# Patient Record
Sex: FEMALE | Race: WHITE | URBAN - METROPOLITAN AREA
[De-identification: names, ages, dates, MRNs, and addresses within clinical notes are randomized per-mention and may not be internally consistent; named-entity substitution may affect disease eponyms.]

---

## 2024-05-20 ENCOUNTER — OFFICE VISIT (OUTPATIENT)
Age: 69
End: 2024-05-20
Payer: COMMERCIAL

## 2024-05-20 VITALS
DIASTOLIC BLOOD PRESSURE: 61 MMHG | WEIGHT: 164 LBS | HEART RATE: 72 BPM | RESPIRATION RATE: 18 BRPM | TEMPERATURE: 96.9 F | OXYGEN SATURATION: 98 % | SYSTOLIC BLOOD PRESSURE: 136 MMHG

## 2024-05-20 DIAGNOSIS — J02.9 ACUTE PHARYNGITIS, UNSPECIFIED ETIOLOGY: Primary | ICD-10-CM

## 2024-05-20 LAB — S PYO AG THROAT QL: NEGATIVE

## 2024-05-20 PROCEDURE — 87070 CULTURE OTHR SPECIMN AEROBIC: CPT | Performed by: PHYSICIAN ASSISTANT

## 2024-05-20 PROCEDURE — G0382 LEV 3 HOSP TYPE B ED VISIT: HCPCS | Performed by: PHYSICIAN ASSISTANT

## 2024-05-20 PROCEDURE — 87880 STREP A ASSAY W/OPTIC: CPT | Performed by: PHYSICIAN ASSISTANT

## 2024-05-20 RX ORDER — ATORVASTATIN CALCIUM 20 MG/1
TABLET, FILM COATED ORAL
COMMUNITY
Start: 2024-05-15

## 2024-05-20 RX ORDER — IBUPROFEN 200 MG
200 TABLET ORAL EVERY 6 HOURS PRN
Start: 2024-05-20

## 2024-05-20 RX ORDER — PROPRANOLOL HYDROCHLORIDE 20 MG/1
TABLET ORAL
COMMUNITY
Start: 2024-05-20

## 2024-05-20 RX ORDER — LIDOCAINE HYDROCHLORIDE 20 MG/ML
15 SOLUTION OROPHARYNGEAL 4 TIMES DAILY PRN
Qty: 100 ML | Refills: 0 | Status: SHIPPED | OUTPATIENT
Start: 2024-05-20

## 2024-05-20 NOTE — PATIENT INSTRUCTIONS
Pharyngitis     Your rapid strep was negative however we will send the throat culture to confirm and if this is positive we will contact you to discuss a new treatment plan.  This is viral pharyngitis and so an antibiotic is not required.   Warm water gargles with salt 3 times daily.  E-Z throat lozenges.  Tylenol or Advil for pain and discomfort  Stay well-hydrated.   Follow up with PCP in 3-5 days.  Proceed to  ER if symptoms worsen.  If tests are performed, our office will contact you with results only if changes need to made to the care plan discussed with you at the visit. You can review your full results on [facility]'s Mychart.        WHAT YOU NEED TO KNOW:   Pharyngitis, or sore throat, is inflammation of the tissues and structures in your pharynx (throat). Pharyngitis is most often caused by bacteria or a virus. Other causes include smoking, allergies, or acid reflux.  DISCHARGE INSTRUCTIONS:   Call your local emergency number (911 in the ) if:   You have trouble breathing or swallowing because your throat is swollen.      Return to the emergency department if:   You are drooling because it hurts too much to swallow.    Your fever is higher than 102?F (39?C) or lasts longer than 3 days.    You are confused.    You taste blood.    Call your doctor if:   Your throat pain gets worse.    You have a painful lump in your throat that does not go away after 5 days.    Your symptoms do not improve after 5 days.    You have questions or concerns about your condition or care.    Medicines:  Viral pharyngitis will go away on its own without treatment. Your sore throat should start to feel better in 3 to 5 days. You may need any of the following:  Antibiotics  treat a bacterial infection.    NSAIDs  help decrease swelling and pain or fever. This medicine is available with or without a doctor's order. NSAIDs can cause stomach bleeding or kidney problems in certain people. If you take blood thinner medicine, always ask  your healthcare provider if NSAIDs are safe for you. Always read the medicine label and follow directions.    Acetaminophen  decreases pain and fever. It is available without a doctor's order. Ask how much to take and how often to take it. Follow directions. Read the labels of all other medicines you are using to see if they also contain acetaminophen, or ask your doctor or pharmacist. Acetaminophen can cause liver damage if not taken correctly.    Take your medicine as directed.  Contact your healthcare provider if you think your medicine is not helping or if you have side effects. Tell your provider if you are allergic to any medicine. Keep a list of the medicines, vitamins, and herbs you take. Include the amounts, and when and why you take them. Bring the list or the pill bottles to follow-up visits. Carry your medicine list with you in case of an emergency.    Manage your symptoms:   Gargle salt water.  Mix ¼ teaspoon salt in an 8 ounce glass of warm water and gargle. Do not swallow. Salt water may help decrease swelling in your throat.    Drink liquids as directed.  You may need to drink more liquids than usual. Liquids may help soothe your throat and prevent dehydration. Ask how much liquid to drink each day and which liquids are best for you.    Use a cool mist humidifier.  This will add moisture to the air and help decrease your cough.    Soothe your throat.  Cough drops, ice, soft foods, or popsicles may help decrease throat pain.    Prevent the spread of pharyngitis:  Cover your mouth and nose when you cough or sneeze. Do not share food or drinks. Wash your hands often. Use soap and water. If soap and water are unavailable, use an alcohol-based hand .       Follow up with your doctor as directed:  Write down your questions so you remember to ask them during your visits.  © Copyright [employer] 2023 Information is for End User's use only and may not be sold, redistributed or otherwise used for  commercial purposes.  The above information is an  only. It is not intended as medical advice for individual conditions or treatments. Talk to your doctor, nurse or pharmacist before following any medical regimen to see if it is safe and effective for you.

## 2024-05-20 NOTE — PROGRESS NOTES
[facility]        NAME: Camrita Wang is a 69 y.o. female  : 1955    MRN: [id number]  DATE: May 20, 2024  TIME: 1:45 PM    Assessment and Plan   Acute pharyngitis, unspecified etiology [J02.9]  1. Acute pharyngitis, unspecified etiology  Lidocaine Viscous HCl (XYLOCAINE) 2 % mucosal solution    ibuprofen (MOTRIN) 200 mg tablet    POCT rapid ANTIGEN strepA    Throat culture            Patient Instructions     Your rapid strep was negative however we will send the throat culture to confirm and if this is positive we will contact you to discuss a new treatment plan.  This is viral pharyngitis and so an antibiotic is not required.   Warm water gargles with salt 3 times daily.  E-Z throat lozenges.  Tylenol or Advil for pain and discomfort  Stay well-hydrated.       Follow up with PCP in 3-5 days.  Proceed to  ER if symptoms worsen.    If tests are performed, our office will contact you with results only if changes need to made to the care plan discussed with you at the visit. [facility]    Chief Complaint     Chief Complaint   Patient presents with    Sore Throat     Pt states she has sinus congestion, slight cough, sore throat, swollen glands, headache, body aches, and ear pressure for about a week. Minor chest pain occurred today         History of Present Illness       Sore Throat   This is a new problem. The current episode started in the past 7 days. The problem has been gradually worsening. There has been no fever. Associated symptoms include congestion, coughing, ear pain, headaches, a plugged ear sensation and trouble swallowing. Pertinent negatives include no drooling, shortness of breath or vomiting. She has had no exposure to strep or mono. She has tried gargles for the symptoms.       Review of Systems   Review of Systems   Constitutional:  Negative for activity change, appetite change and fever.   HENT:  Positive for congestion, ear pain, sinus  pressure, sore throat and trouble swallowing. Negative for drooling.    Respiratory:  Positive for cough. Negative for shortness of breath.    Gastrointestinal:  Negative for vomiting.   Skin:  Negative for rash.   Neurological:  Positive for headaches.         Current Medications       Current Outpatient Medications:     atorvastatin (LIPITOR) 20 mg tablet, , Disp: , Rfl:     ibuprofen (MOTRIN) 200 mg tablet, Take 1 tablet (200 mg total) by mouth every 6 (six) hours as needed for mild pain, Disp: , Rfl:     Lidocaine Viscous HCl (XYLOCAINE) 2 % mucosal solution, Swish and spit 15 mL 4 (four) times a day as needed for mouth pain or discomfort, Disp: 100 mL, Rfl: 0    propranolol (INDERAL) 20 mg tablet, , Disp: , Rfl:     Current Allergies     Allergies as of 05/20/2024 - Reviewed 05/20/2024   Allergen Reaction Noted    Primidone GI Intolerance and Dizziness 05/20/2024            The following portions of the patient's history were reviewed and updated as appropriate: allergies, current medications, past family history, past medical history, past social history, past surgical history and problem list.     History reviewed. No pertinent past medical history.    Past Surgical History:   Procedure Laterality Date    HYSTERECTOMY  2002       History reviewed. No pertinent family history.      Medications have been verified.        Objective   /61   Pulse 72   Temp (!) 96.9 °F (36.1 °C)   Resp 18   Wt 74.4 kg (164 lb)   SpO2 98%        Physical Exam     Physical Exam  Vitals and nursing note reviewed.   Constitutional:       General: She is not in acute distress.     Appearance: She is well-developed and normal weight. She is not ill-appearing, toxic-appearing or diaphoretic.   HENT:      Head: Normocephalic and atraumatic.      Right Ear: Tympanic membrane and ear canal normal.      Left Ear: Tympanic membrane and ear canal normal.      Nose: Congestion and rhinorrhea present.      Mouth/Throat:      Mouth:  Mucous membranes are moist. Oral lesions present.      Pharynx: Posterior oropharyngeal erythema present. No pharyngeal swelling, oropharyngeal exudate or uvula swelling.      Tonsils: No tonsillar exudate or tonsillar abscesses. 0 on the right. 0 on the left.   Eyes:      Extraocular Movements:      Right eye: Normal extraocular motion.      Left eye: Normal extraocular motion.      Conjunctiva/sclera: Conjunctivae normal.      Pupils: Pupils are equal, round, and reactive to light.   Neck:      Thyroid: No thyromegaly.   Cardiovascular:      Rate and Rhythm: Normal rate and regular rhythm.      Heart sounds: Normal heart sounds.   Pulmonary:      Effort: Pulmonary effort is normal.      Breath sounds: Normal breath sounds. No wheezing.   Abdominal:      Palpations: There is no mass.   Musculoskeletal:      Cervical back: Normal range of motion and neck supple.   Lymphadenopathy:      Cervical: No cervical adenopathy.   Skin:     General: Skin is warm and dry.      Capillary Refill: Capillary refill takes less than 2 seconds.      Findings: No rash.   Neurological:      General: No focal deficit present.      Mental Status: She is alert and oriented to person, place, and time.   Psychiatric:         Mood and Affect: Mood normal.         Behavior: Behavior normal.

## 2024-05-22 ENCOUNTER — TELEPHONE (OUTPATIENT)
Age: 69
End: 2024-05-22

## 2024-05-22 LAB — BACTERIA THROAT CULT: NORMAL
